# Patient Record
Sex: FEMALE | Race: WHITE | NOT HISPANIC OR LATINO | URBAN - NONMETROPOLITAN AREA
[De-identification: names, ages, dates, MRNs, and addresses within clinical notes are randomized per-mention and may not be internally consistent; named-entity substitution may affect disease eponyms.]

---

## 2023-12-04 LAB
NON-UH HIE ALANINE AMINOTRANSFERASE: 10 U/L (ref 7–52)
NON-UH HIE ALBUMIN LEVEL: 4.4 G/DL (ref 3.5–5.7)
NON-UH HIE ALBUMIN/GLOBULIN RATIO: 1.6
NON-UH HIE ALKALINE PHOSPHATASE: 88 U/L (ref 34–104)
NON-UH HIE ANION GAP: 7.3 (ref 6–15)
NON-UH HIE ASPARTATE AMINO TRANSFERASE: 14 U/L (ref 13–39)
NON-UH HIE BASOPHILS # (AUTO): 0.1 10*3/UL (ref 0–0.2)
NON-UH HIE BASOPHILS % (AUTO): 1.3 %
NON-UH HIE BILIRUBIN,TOTAL: 0.4 MG/DL (ref 0.3–1)
NON-UH HIE BLOOD UREA NITROGEN: 11 MG/DL (ref 7–25)
NON-UH HIE CALCIUM: 9.8 MG/DL (ref 8.6–10.3)
NON-UH HIE CARBON DIOXIDE: 28.9 MMOL/L (ref 21–31)
NON-UH HIE CHLORIDE: 108 MMOL/L (ref 98–107)
NON-UH HIE CHOL/HDL RATIO: 2.9
NON-UH HIE CHOLESTEROL: 173 MG/DL (ref 140–200)
NON-UH HIE CREATININE: 0.82 MG/DL (ref 0.6–1.2)
NON-UH HIE EOSINOPHILS # (AUTO): 0.1 10*3/UL (ref 0–0.45)
NON-UH HIE EOSINOPHILS % (AUTO): 1.4 %
NON-UH HIE ESTIMATED GFR: > 60
NON-UH HIE GLOBULIN: 2.8 G/DL
NON-UH HIE GLUCOSE: 94 MG/DL (ref 70–100)
NON-UH HIE HDL CHOLESTEROL: 59 MG/DL (ref 23–92)
NON-UH HIE HEMATOCRIT: 47.5 % (ref 34–46.4)
NON-UH HIE HEMOGLOBIN: 16 G/DL (ref 11.8–15.4)
NON-UH HIE LDL CHOLESTEROL,CALCULATED: 99 MG/DL (ref 0–100)
NON-UH HIE LYMPHOCYTES # (AUTO): 2.6 10*3/UL (ref 1–4.8)
NON-UH HIE LYMPHOCYTES % (AUTO): 25.1 %
NON-UH HIE MEAN CORPUSCULAR HEMOGLOBIN: 33.3 PG (ref 24.7–34.3)
NON-UH HIE MEAN CORPUSCULAR HGB CONC: 33.7 G/DL (ref 32–35)
NON-UH HIE MEAN CORPUSCULAR VOLUME: 99 FL (ref 80–100)
NON-UH HIE MEAN PLATELET VOLUME: 7.3 FL (ref 6.3–10.7)
NON-UH HIE MONOCYTES # (AUTO): 0.6 10*3/UL (ref 0–0.8)
NON-UH HIE MONOCYTES % (AUTO): 5.5 %
NON-UH HIE NEUTROPHILS # (AUTO): 6.9 10*3/UL (ref 1.8–7.7)
NON-UH HIE NEUTROPHILS % (AUTO): 66.7 %
NON-UH HIE NRBC%: 0.1 /100{WBC} (ref 0–0.5)
NON-UH HIE PLATELET COUNT: 250 10*3/UL (ref 150–450)
NON-UH HIE POTASSIUM: 4.2 MMOL/L (ref 3.5–5.1)
NON-UH HIE RED BLOOD COUNT: 4.8 (ref 3.6–5)
NON-UH HIE RED CELL DISTRIBUTION WIDTH: 13.6 % (ref 11.9–15.3)
NON-UH HIE SODIUM: 140 MMOL/L (ref 136–145)
NON-UH HIE TOTAL PROTEIN: 7.2 G/DL (ref 6.4–8.9)
NON-UH HIE TRIGLYCERIDE W/REFLEX: 75 MG/DL (ref 0–149)
NON-UH HIE UNCORRECTED WBC: 10.3 10*3/UL (ref 3.8–11.6)
NON-UH HIE VLDL CHOLESTEROL: 15 MG/DL
NON-UH HIE WHITE BLOOD COUNT: 10.3 10*3/UL (ref 3.8–11.6)

## 2023-12-11 ENCOUNTER — TELEPHONE (OUTPATIENT)
Dept: CARDIOLOGY | Facility: CLINIC | Age: 63
End: 2023-12-11
Payer: COMMERCIAL

## 2023-12-11 DIAGNOSIS — I25.118 CORONARY ARTERY DISEASE OF NATIVE ARTERY OF NATIVE HEART WITH STABLE ANGINA PECTORIS (CMS-HCC): ICD-10-CM

## 2023-12-11 DIAGNOSIS — E78.5 HYPERLIPIDEMIA, UNSPECIFIED HYPERLIPIDEMIA TYPE: ICD-10-CM

## 2023-12-11 NOTE — TELEPHONE ENCOUNTER
Patient called for lab results. Lab results reviewed. To Dr. Dedra Saenz MD to verify no new orders.

## 2023-12-12 PROBLEM — K21.9 GASTROESOPHAGEAL REFLUX DISEASE: Status: ACTIVE | Noted: 2023-12-12

## 2023-12-12 PROBLEM — R94.02 ABNORMAL PET SCAN OF HEAD: Status: ACTIVE | Noted: 2023-12-12

## 2023-12-12 PROBLEM — I25.118 CORONARY ARTERY DISEASE OF NATIVE ARTERY OF NATIVE HEART WITH STABLE ANGINA PECTORIS (CMS-HCC): Status: ACTIVE | Noted: 2023-12-12

## 2023-12-12 PROBLEM — E78.5 HYPERLIPIDEMIA: Status: ACTIVE | Noted: 2023-12-12

## 2023-12-12 PROBLEM — J38.1 VOCAL CORD POLYPS: Status: ACTIVE | Noted: 2023-12-12

## 2023-12-12 PROBLEM — R49.0 HOARSENESS: Status: ACTIVE | Noted: 2023-12-12

## 2023-12-12 PROBLEM — F17.210 CIGARETTE SMOKER: Status: ACTIVE | Noted: 2023-12-12

## 2023-12-12 PROBLEM — N20.0 KIDNEY STONE: Status: ACTIVE | Noted: 2023-12-12

## 2023-12-12 PROBLEM — I10 ESSENTIAL HYPERTENSION: Status: ACTIVE | Noted: 2023-12-12

## 2023-12-12 PROBLEM — Z95.5 S/P RIGHT CORONARY ARTERY (RCA) STENT PLACEMENT: Status: ACTIVE | Noted: 2023-12-12

## 2023-12-12 PROBLEM — K11.5 SIALOLITHIASIS: Status: ACTIVE | Noted: 2023-12-12

## 2023-12-12 RX ORDER — TIOTROPIUM BROMIDE INHALATION SPRAY 3.12 UG/1
SPRAY, METERED RESPIRATORY (INHALATION) EVERY 24 HOURS
COMMUNITY

## 2023-12-12 RX ORDER — POTASSIUM CHLORIDE 20 MEQ/1
20 TABLET, EXTENDED RELEASE ORAL DAILY
COMMUNITY
Start: 2023-07-27 | End: 2024-04-29 | Stop reason: SDUPTHER

## 2023-12-12 RX ORDER — ESOMEPRAZOLE MAGNESIUM 40 MG/1
1 CAPSULE, DELAYED RELEASE ORAL EVERY 24 HOURS
COMMUNITY
Start: 2018-02-09

## 2023-12-12 RX ORDER — CLOPIDOGREL BISULFATE 75 MG/1
75 TABLET ORAL DAILY
COMMUNITY
End: 2024-02-09 | Stop reason: SDUPTHER

## 2023-12-12 RX ORDER — ATORVASTATIN CALCIUM 20 MG/1
20 TABLET, FILM COATED ORAL NIGHTLY
COMMUNITY
Start: 2023-11-05 | End: 2023-12-13 | Stop reason: DRUGHIGH

## 2023-12-12 RX ORDER — METOPROLOL SUCCINATE 100 MG/1
50 TABLET, EXTENDED RELEASE ORAL DAILY
COMMUNITY
Start: 2023-11-05 | End: 2024-01-03 | Stop reason: SDUPTHER

## 2023-12-12 NOTE — TELEPHONE ENCOUNTER
Patient contacted. She is currently taking Lipitor 20 mg daily. She has taken 40 mg in the past and tolerated it. To Dr. Dedra Saenz MD

## 2023-12-13 RX ORDER — ATORVASTATIN CALCIUM 40 MG/1
40 TABLET, FILM COATED ORAL DAILY
Qty: 90 TABLET | Refills: 3 | Status: SHIPPED | OUTPATIENT
Start: 2023-12-13 | End: 2024-04-29 | Stop reason: SDUPTHER

## 2023-12-13 NOTE — TELEPHONE ENCOUNTER
Patient contacted and updated. Will mail lab order. Rx to Dr. Dedra Saenz MD to sign and sent to [facility]

## 2024-01-03 DIAGNOSIS — I10 ESSENTIAL HYPERTENSION: ICD-10-CM

## 2024-01-03 RX ORDER — METOPROLOL SUCCINATE 100 MG/1
50 TABLET, EXTENDED RELEASE ORAL DAILY
Qty: 90 TABLET | Refills: 0 | Status: SHIPPED | OUTPATIENT
Start: 2024-01-03 | End: 2024-04-29 | Stop reason: SDUPTHER

## 2024-01-18 PROBLEM — Z98.61 HISTORY OF PTCA: Status: ACTIVE | Noted: 2024-01-18

## 2024-01-18 PROBLEM — I25.10 ATHEROSCLEROSIS OF NATIVE CORONARY ARTERY OF NATIVE HEART WITHOUT ANGINA PECTORIS: Status: ACTIVE | Noted: 2024-01-18

## 2024-02-09 DIAGNOSIS — I25.10 ATHEROSCLEROSIS OF NATIVE CORONARY ARTERY OF NATIVE HEART WITHOUT ANGINA PECTORIS: ICD-10-CM

## 2024-02-09 RX ORDER — CLOPIDOGREL BISULFATE 75 MG/1
75 TABLET ORAL DAILY
Qty: 90 TABLET | Refills: 0 | Status: SHIPPED | OUTPATIENT
Start: 2024-02-09 | End: 2024-04-29 | Stop reason: SDUPTHER

## 2024-03-11 ENCOUNTER — APPOINTMENT (OUTPATIENT)
Dept: CARDIOLOGY | Facility: CLINIC | Age: 64
End: 2024-03-11
Payer: COMMERCIAL

## 2024-04-04 LAB
NON-UH HIE ALANINE AMINOTRANSFERASE: 12 U/L (ref 7–52)
NON-UH HIE ASPARTATE AMINO TRANSFERASE: 15 U/L (ref 13–39)
NON-UH HIE CHOL/HDL RATIO: 2.7
NON-UH HIE CHOLESTEROL: 129 MG/DL (ref 140–200)
NON-UH HIE HDL CHOLESTEROL: 48 MG/DL (ref 23–92)
NON-UH HIE LDL CHOLESTEROL,CALCULATED: 62 MG/DL (ref 0–100)
NON-UH HIE TRIGLYCERIDE W/REFLEX: 95 MG/DL (ref 0–149)
NON-UH HIE VLDL CHOLESTEROL: 19 MG/DL

## 2024-04-08 ENCOUNTER — APPOINTMENT (OUTPATIENT)
Dept: CARDIOLOGY | Facility: CLINIC | Age: 64
End: 2024-04-08
Payer: COMMERCIAL

## 2024-04-29 ENCOUNTER — OFFICE VISIT (OUTPATIENT)
Dept: CARDIOLOGY | Facility: CLINIC | Age: 64
End: 2024-04-29
Payer: COMMERCIAL

## 2024-04-29 VITALS
DIASTOLIC BLOOD PRESSURE: 76 MMHG | BODY MASS INDEX: 22.63 KG/M2 | HEIGHT: 62 IN | WEIGHT: 123 LBS | SYSTOLIC BLOOD PRESSURE: 138 MMHG | HEART RATE: 92 BPM

## 2024-04-29 DIAGNOSIS — I10 ESSENTIAL HYPERTENSION: ICD-10-CM

## 2024-04-29 DIAGNOSIS — E78.5 HYPERLIPIDEMIA, UNSPECIFIED HYPERLIPIDEMIA TYPE: ICD-10-CM

## 2024-04-29 DIAGNOSIS — Z13.6 SCREENING FOR AAA (ABDOMINAL AORTIC ANEURYSM): ICD-10-CM

## 2024-04-29 DIAGNOSIS — F17.210 CIGARETTE SMOKER: ICD-10-CM

## 2024-04-29 DIAGNOSIS — Z98.61 HISTORY OF PTCA: ICD-10-CM

## 2024-04-29 DIAGNOSIS — I25.10 ATHEROSCLEROSIS OF NATIVE CORONARY ARTERY OF NATIVE HEART WITHOUT ANGINA PECTORIS: ICD-10-CM

## 2024-04-29 DIAGNOSIS — Z95.5 S/P RIGHT CORONARY ARTERY (RCA) STENT PLACEMENT: ICD-10-CM

## 2024-04-29 DIAGNOSIS — I47.10 PAROXYSMAL SUPRAVENTRICULAR TACHYCARDIA (CMS-HCC): ICD-10-CM

## 2024-04-29 DIAGNOSIS — I25.118 CORONARY ARTERY DISEASE OF NATIVE ARTERY OF NATIVE HEART WITH STABLE ANGINA PECTORIS (CMS-HCC): ICD-10-CM

## 2024-04-29 PROCEDURE — 1160F RVW MEDS BY RX/DR IN RCRD: CPT | Performed by: INTERNAL MEDICINE

## 2024-04-29 PROCEDURE — 3008F BODY MASS INDEX DOCD: CPT | Performed by: INTERNAL MEDICINE

## 2024-04-29 PROCEDURE — 3078F DIAST BP <80 MM HG: CPT | Performed by: INTERNAL MEDICINE

## 2024-04-29 PROCEDURE — 3075F SYST BP GE 130 - 139MM HG: CPT | Performed by: INTERNAL MEDICINE

## 2024-04-29 PROCEDURE — 1159F MED LIST DOCD IN RCRD: CPT | Performed by: INTERNAL MEDICINE

## 2024-04-29 PROCEDURE — 99214 OFFICE O/P EST MOD 30 MIN: CPT | Performed by: INTERNAL MEDICINE

## 2024-04-29 RX ORDER — ATORVASTATIN CALCIUM 40 MG/1
40 TABLET, FILM COATED ORAL DAILY
Qty: 90 TABLET | Refills: 3 | Status: SHIPPED | OUTPATIENT
Start: 2024-04-29 | End: 2025-04-29

## 2024-04-29 RX ORDER — CLOPIDOGREL BISULFATE 75 MG/1
75 TABLET ORAL DAILY
Qty: 90 TABLET | Refills: 3 | Status: SHIPPED | OUTPATIENT
Start: 2024-04-29 | End: 2025-04-29

## 2024-04-29 RX ORDER — POTASSIUM CHLORIDE 20 MEQ/1
20 TABLET, EXTENDED RELEASE ORAL DAILY
Qty: 90 TABLET | Refills: 3 | Status: SHIPPED | OUTPATIENT
Start: 2024-04-29 | End: 2025-04-29

## 2024-04-29 RX ORDER — METOPROLOL SUCCINATE 100 MG/1
50 TABLET, EXTENDED RELEASE ORAL DAILY
Qty: 45 TABLET | Refills: 3 | Status: SHIPPED | OUTPATIENT
Start: 2024-04-29 | End: 2025-04-29

## 2024-04-29 NOTE — PROGRESS NOTES
"Most recently seen July 2023.  We do not have any records pertaining to previous cardiac workup to include cardiac catheterization PCI echo stress test.    Subjective :     Interval review of systems is negative for chest discomfort pressure tightness heaviness palpitations lightheadedness orthopnea paroxysmal nocturnal dyspnea dependent edema or claudication TIA or CVA type symptoms or bleeding diathesis      History so Far :  1. Atherosclerotic native vessel coronary artery disease with history of coronary event in 2017 and 1 stent-this is per patient  2. No symptoms of angina or palpitations or congestive heart failure at this time  3. Current 1 pack/day smoker  4. GERD-on prophylaxis  5. Patient remains on dual antiplatelet therapy  6. History of partial hysterectomy  7. History of renal lithotripsy  8. History of paroxysmal supraventricular tachycardia  9. Hyperlipidemia  10.  Has established primary care with Dr. Hill Sutherland.  11.  Carotid ultrasound August 2021-less than 50% bilateral internal carotid artery stenosis    Objective   Wt Readings from Last 3 Encounters:   04/29/24 55.8 kg (123 lb)   07/27/23 52.6 kg (116 lb)   08/04/22 47.2 kg (104 lb)            Vitals:    04/29/24 1255   BP: 138/76   BP Location: Right arm   Patient Position: Sitting   Pulse: 92   Weight: 55.8 kg (123 lb)   Height: 1.575 m (5' 2\")                Physical Exam:    GENERAL APPEARANCE: in no acute distress.  CHEST: Symmetric and non-tender.  INTEGUMENT: Skin warm and dry  HEENT: No gross abnormalities identified.No pallor or scleral icterus.  NECK: Supple, no JVD, no bruit.   NEURO/PSHCY: Alert and oriented x3; appropriate behavior and responses and responses  LUNGS: Clear to auscultation bilaterally; normal respiratory effort.  HEART: Rate and rhythm regular with no evident murmur; no gallop appreciated.   ABDOMEN: Soft, non tender.  MUSCULOSKELETAL: No gross deformities.  EXTREMITIES: Warm  There is no edema " noted.    Meds:  Current Outpatient Medications   Medication Instructions    atorvastatin (LIPITOR) 40 mg, oral, Daily    clopidogrel (PLAVIX) 75 mg, oral, Daily    esomeprazole (NexIUM) 40 mg DR capsule 1 capsule, Every 24 hours    metoprolol succinate XL (TOPROL-XL) 50 mg, oral, Daily    potassium chloride CR 20 mEq ER tablet 20 mEq, oral, Daily    tiotropium (Spiriva Respimat) 2.5 mcg/actuation inhaler Every 24 hours          No Known Allergies    Lipid profile April 2024 total cholesterol 129 HDL 48 triglycerides 95 LDL 62 ALT 12 AST 15      LABS:    Lab Results   Component Value Date    WBC 8.5 05/12/2022    HGB 16.2 (H) 05/12/2022    HCT 48.5 (H) 05/12/2022     05/12/2022    CHOL 101 (L) 07/29/2021    TRIG 87 07/29/2021    HDL 32 (L) 07/29/2021    ALT 11 05/12/2022    AST 18 05/12/2022     05/12/2022    K 3.9 05/12/2022    CL 99 05/12/2022    CREATININE 0.6 05/12/2022    BUN 9 05/12/2022    CO2 26 05/12/2022    TSH 1.66 05/12/2022    INR 0.9 03/21/2019    HGBA1C 5.4 05/12/2022                       Patient Active Problem List    Diagnosis Date Noted    BMI 22.0-22.9, adult 04/29/2024    Paroxysmal supraventricular tachycardia (CMS-HCC) 04/29/2024    Atherosclerosis of native coronary artery of native heart without angina pectoris 01/18/2024    History of PTCA 01/18/2024    Abnormal PET scan of head 12/12/2023    Coronary artery disease of native artery of native heart with stable angina pectoris (CMS-HCC) 12/12/2023    Gastroesophageal reflux disease 12/12/2023    Sialolithiasis 12/12/2023    Essential hypertension 12/12/2023    Hoarseness 12/12/2023    Kidney stone 12/12/2023    S/P right coronary artery (RCA) stent placement 12/12/2023    Cigarette smoker 12/12/2023    Vocal cord polyps 12/12/2023    Hyperlipidemia 12/12/2023                 Assessment:    1. Coronary artery disease of native artery of native heart with stable angina pectoris (CMS-HCC)  atorvastatin (Lipitor) 40 mg tablet     potassium chloride CR 20 mEq ER tablet    Comprehensive Metabolic Panel      2. Hyperlipidemia, unspecified hyperlipidemia type  atorvastatin (Lipitor) 40 mg tablet    Lipid Panel      3. Atherosclerosis of native coronary artery of native heart without angina pectoris  clopidogrel (Plavix) 75 mg tablet    Comprehensive Metabolic Panel      4. Essential hypertension  Follow Up In Cardiology    metoprolol succinate XL (Toprol-XL) 100 mg 24 hr tablet    potassium chloride CR 20 mEq ER tablet    Comprehensive Metabolic Panel      5. Cigarette smoker  Vascular US Abdominal Aorta Aneurysm AAA Screening      6. BMI 22.0-22.9, adult        7. S/P right coronary artery (RCA) stent placement        8. History of PTCA        9. Paroxysmal supraventricular tachycardia (CMS-HCC)  potassium chloride CR 20 mEq ER tablet    Comprehensive Metabolic Panel      10. Screening for AAA (abdominal aortic aneurysm)  Vascular US Abdominal Aorta Aneurysm AAA Screening         Clinical decision making:  Still waiting for records pertaining to prior cardiac workup such as cardiac catheterization echo PCI reports.  Heart healthy lifestyle discussed, smoking cessation encouraged.  Comprehensive profile and lipid profile to be done prior to next visit  Screening abdominal aortic ultrasound-age 63, smoker, call for results.  Blood pressure is at target  Lipid profile is at target.  BMI is at target    Follow up : after testing        Provider Attestation - Scribe documentation    All medical record entries made by the Scribe were at my direction and personally dictated by me. I have reviewed the chart and agree that the record accurately reflects my personal performance of the history, physical exam, discussion and plan.

## 2024-04-29 NOTE — LETTER
"April 30, 2024     Hill Sutherland MD  [address] [place] [address]  [place] [address]    Patient: Lamar Neal   YOB: 1960   Date of Visit: 4/29/2024       Dear Dr. Hill Sutherland MD:    Thank you for referring Lamar Neal to me for evaluation. Below are my notes for this consultation.  If you have questions, please do not hesitate to call me. I look forward to following your patient along with you.       Sincerely,     Dedra Saenz MD      CC: No Recipients  ______________________________________________________________________________________    Most recently seen July 2023.  We do not have any records pertaining to previous cardiac workup to include cardiac catheterization PCI echo stress test.    Subjective :     Interval review of systems is negative for chest discomfort pressure tightness heaviness palpitations lightheadedness orthopnea paroxysmal nocturnal dyspnea dependent edema or claudication TIA or CVA type symptoms or bleeding diathesis      History so Far :  1. Atherosclerotic native vessel coronary artery disease with history of coronary event in 2017 and 1 stent-this is per patient  2. No symptoms of angina or palpitations or congestive heart failure at this time  3. Current 1 pack/day smoker  4. GERD-on prophylaxis  5. Patient remains on dual antiplatelet therapy  6. History of partial hysterectomy  7. History of renal lithotripsy  8. History of paroxysmal supraventricular tachycardia  9. Hyperlipidemia  10.  Has established primary care with Dr. Hill Sutherland.  11.  Carotid ultrasound August 2021-less than 50% bilateral internal carotid artery stenosis    Objective   Wt Readings from Last 3 Encounters:   04/29/24 55.8 kg (123 lb)   07/27/23 52.6 kg (116 lb)   08/04/22 47.2 kg (104 lb)            Vitals:    04/29/24 1255   BP: 138/76   BP Location: Right arm   Patient Position: Sitting   Pulse: 92   Weight: 55.8 kg (123 lb)   Height: 1.575 m (5' 2\")                Physical Exam:    GENERAL " APPEARANCE: in no acute distress.  CHEST: Symmetric and non-tender.  INTEGUMENT: Skin warm and dry  HEENT: No gross abnormalities identified.No pallor or scleral icterus.  NECK: Supple, no JVD, no bruit.   NEURO/PSHCY: Alert and oriented x3; appropriate behavior and responses and responses  LUNGS: Clear to auscultation bilaterally; normal respiratory effort.  HEART: Rate and rhythm regular with no evident murmur; no gallop appreciated.   ABDOMEN: Soft, non tender.  MUSCULOSKELETAL: No gross deformities.  EXTREMITIES: Warm  There is no edema noted.    Meds:  Current Outpatient Medications   Medication Instructions    atorvastatin (LIPITOR) 40 mg, oral, Daily    clopidogrel (PLAVIX) 75 mg, oral, Daily    esomeprazole (NexIUM) 40 mg DR capsule 1 capsule, Every 24 hours    metoprolol succinate XL (TOPROL-XL) 50 mg, oral, Daily    potassium chloride CR 20 mEq ER tablet 20 mEq, oral, Daily    tiotropium (Spiriva Respimat) 2.5 mcg/actuation inhaler Every 24 hours          No Known Allergies    Lipid profile April 2024 total cholesterol 129 HDL 48 triglycerides 95 LDL 62 ALT 12 AST 15      LABS:    Lab Results   Component Value Date    WBC 8.5 05/12/2022    HGB 16.2 (H) 05/12/2022    HCT 48.5 (H) 05/12/2022     05/12/2022    CHOL 101 (L) 07/29/2021    TRIG 87 07/29/2021    HDL 32 (L) 07/29/2021    ALT 11 05/12/2022    AST 18 05/12/2022     05/12/2022    K 3.9 05/12/2022    CL 99 05/12/2022    CREATININE 0.6 05/12/2022    BUN 9 05/12/2022    CO2 26 05/12/2022    TSH 1.66 05/12/2022    INR 0.9 03/21/2019    HGBA1C 5.4 05/12/2022                       Patient Active Problem List    Diagnosis Date Noted    BMI 22.0-22.9, adult 04/29/2024    Paroxysmal supraventricular tachycardia (CMS-HCC) 04/29/2024    Atherosclerosis of native coronary artery of native heart without angina pectoris 01/18/2024    History of PTCA 01/18/2024    Abnormal PET scan of head 12/12/2023    Coronary artery disease of native artery of  native heart with stable angina pectoris (CMS-HCC) 12/12/2023    Gastroesophageal reflux disease 12/12/2023    Sialolithiasis 12/12/2023    Essential hypertension 12/12/2023    Hoarseness 12/12/2023    Kidney stone 12/12/2023    S/P right coronary artery (RCA) stent placement 12/12/2023    Cigarette smoker 12/12/2023    Vocal cord polyps 12/12/2023    Hyperlipidemia 12/12/2023                 Assessment:    1. Coronary artery disease of native artery of native heart with stable angina pectoris (CMS-HCC)  atorvastatin (Lipitor) 40 mg tablet    potassium chloride CR 20 mEq ER tablet    Comprehensive Metabolic Panel      2. Hyperlipidemia, unspecified hyperlipidemia type  atorvastatin (Lipitor) 40 mg tablet    Lipid Panel      3. Atherosclerosis of native coronary artery of native heart without angina pectoris  clopidogrel (Plavix) 75 mg tablet    Comprehensive Metabolic Panel      4. Essential hypertension  Follow Up In Cardiology    metoprolol succinate XL (Toprol-XL) 100 mg 24 hr tablet    potassium chloride CR 20 mEq ER tablet    Comprehensive Metabolic Panel      5. Cigarette smoker  Vascular US Abdominal Aorta Aneurysm AAA Screening      6. BMI 22.0-22.9, adult        7. S/P right coronary artery (RCA) stent placement        8. History of PTCA        9. Paroxysmal supraventricular tachycardia (CMS-[facility])  potassium chloride CR 20 mEq ER tablet    Comprehensive Metabolic Panel      10. Screening for AAA (abdominal aortic aneurysm)  Vascular US Abdominal Aorta Aneurysm AAA Screening         Clinical decision making:  Still waiting for records pertaining to prior cardiac workup such as cardiac catheterization echo PCI reports.  Heart healthy lifestyle discussed, smoking cessation encouraged.  Comprehensive profile and lipid profile to be done prior to next visit  Screening abdominal aortic ultrasound-age 63, smoker, call for results.  Blood pressure is at target  Lipid profile is at target.  BMI is at target    Follow  up : {follow up:69524}        Provider Attestation - Scribe documentation    All medical record entries made by the Scribe were at my direction and personally dictated by me. I have reviewed the chart and agree that the record accurately reflects my personal performance of the history, physical exam, discussion and plan.

## 2024-05-14 ENCOUNTER — TELEPHONE (OUTPATIENT)
Dept: CARDIOLOGY | Facility: CLINIC | Age: 64
End: 2024-05-14
Payer: COMMERCIAL

## 2024-05-14 NOTE — TELEPHONE ENCOUNTER
----- Message from Danya Jett sent at 5/14/2024 11:01 AM EDT -----  Patient called office today and advised she went to [facility] urgent care. She had a chest x-ray done and was originally told she had pneumonia, then radiology advised her it was not pneumonia, but something was seen and she should get a referral to a pulmonologist. Patient would like to know if it is possible for Dr. Saenz to refer her to Dr. Parikh / Dr. Nolen. Please advise.

## 2024-05-30 ENCOUNTER — HOSPITAL ENCOUNTER (OUTPATIENT)
Dept: CARDIOLOGY | Facility: CLINIC | Age: 64
Discharge: HOME | End: 2024-05-30
Payer: COMMERCIAL

## 2024-05-30 DIAGNOSIS — F17.210 CIGARETTE SMOKER: ICD-10-CM

## 2024-05-30 DIAGNOSIS — Z13.6 SCREENING FOR AAA (ABDOMINAL AORTIC ANEURYSM): ICD-10-CM

## 2024-05-30 PROCEDURE — 76706 US ABDL AORTA SCREEN AAA: CPT

## 2024-05-30 PROCEDURE — 76706 US ABDL AORTA SCREEN AAA: CPT | Performed by: INTERNAL MEDICINE

## 2024-07-01 ENCOUNTER — TELEPHONE (OUTPATIENT)
Dept: CARDIOLOGY | Facility: CLINIC | Age: 64
End: 2024-07-01
Payer: COMMERCIAL

## 2024-07-01 NOTE — TELEPHONE ENCOUNTER
----- Message from Destiny Ross sent at 5/28/2024 11:49 AM EDT -----  Regarding: cath / echo  No cath at [facility] echo 11/2/21 now in chart   Thanks  ----- Message -----  From: Tracie Rincon LPN  Sent: 4/29/2024   1:48 PM EDT  To: Destiny Ross    2017, echo  through Dr. RODRIGUE Mccabe office [facility] for cardiac cath same year

## 2024-07-01 NOTE — TELEPHONE ENCOUNTER
Echo is now in chart, is further testing required. Please advise.    To Dr. Dedra Saenz MD for review.

## 2024-07-19 NOTE — TELEPHONE ENCOUNTER
Left message with patient requesting call back. Need to inform patient her echo from 2021 is normal and need to check and see if she is having any cardiac symptoms.

## 2024-07-22 NOTE — TELEPHONE ENCOUNTER
Phoned patient with normal ECHO results from 2021. Instructed her to report any new or worsening symptoms. Detailed VM left.

## 2025-01-10 ENCOUNTER — TELEPHONE (OUTPATIENT)
Dept: CARDIOLOGY | Facility: CLINIC | Age: 65
End: 2025-01-10
Payer: COMMERCIAL

## 2025-01-10 NOTE — TELEPHONE ENCOUNTER
Patient phones stating she has been having chest pains that come and go in the middle of her chest. States no other symptoms. Occasional dizziness and lightheadedness when these episodes occur. Also reports nausea when chest pain happens. She is seeking further advice. Please advise     Instructed to go to ER should symptoms recur.

## 2025-01-17 ENCOUNTER — OFFICE VISIT (OUTPATIENT)
Dept: CARDIOLOGY | Facility: CLINIC | Age: 65
End: 2025-01-17
Payer: COMMERCIAL

## 2025-01-17 VITALS
HEIGHT: 62 IN | SYSTOLIC BLOOD PRESSURE: 108 MMHG | DIASTOLIC BLOOD PRESSURE: 66 MMHG | HEART RATE: 68 BPM | WEIGHT: 128 LBS | BODY MASS INDEX: 23.55 KG/M2

## 2025-01-17 DIAGNOSIS — I25.118 CORONARY ARTERY DISEASE OF NATIVE ARTERY OF NATIVE HEART WITH STABLE ANGINA PECTORIS: ICD-10-CM

## 2025-01-17 DIAGNOSIS — F17.210 CIGARETTE SMOKER: ICD-10-CM

## 2025-01-17 DIAGNOSIS — K21.00 GASTROESOPHAGEAL REFLUX DISEASE WITH ESOPHAGITIS, UNSPECIFIED WHETHER HEMORRHAGE: ICD-10-CM

## 2025-01-17 DIAGNOSIS — R07.89 OTHER CHEST PAIN: ICD-10-CM

## 2025-01-17 DIAGNOSIS — Z95.5 S/P RIGHT CORONARY ARTERY (RCA) STENT PLACEMENT: ICD-10-CM

## 2025-01-17 DIAGNOSIS — I20.0 CRESCENDO ANGINA (MULTI): Primary | ICD-10-CM

## 2025-01-17 DIAGNOSIS — E78.2 MIXED HYPERLIPIDEMIA: ICD-10-CM

## 2025-01-17 DIAGNOSIS — Z79.899 MEDICATION COURSE CHANGED: ICD-10-CM

## 2025-01-17 DIAGNOSIS — Z98.61 HISTORY OF PTCA: ICD-10-CM

## 2025-01-17 PROCEDURE — 93000 ELECTROCARDIOGRAM COMPLETE: CPT | Performed by: INTERNAL MEDICINE

## 2025-01-17 PROCEDURE — 99214 OFFICE O/P EST MOD 30 MIN: CPT | Performed by: INTERNAL MEDICINE

## 2025-01-17 PROCEDURE — 1159F MED LIST DOCD IN RCRD: CPT | Performed by: INTERNAL MEDICINE

## 2025-01-17 PROCEDURE — 3008F BODY MASS INDEX DOCD: CPT | Performed by: INTERNAL MEDICINE

## 2025-01-17 PROCEDURE — 1160F RVW MEDS BY RX/DR IN RCRD: CPT | Performed by: INTERNAL MEDICINE

## 2025-01-17 PROCEDURE — 3074F SYST BP LT 130 MM HG: CPT | Performed by: INTERNAL MEDICINE

## 2025-01-17 PROCEDURE — 4004F PT TOBACCO SCREEN RCVD TLK: CPT | Performed by: INTERNAL MEDICINE

## 2025-01-17 PROCEDURE — 3078F DIAST BP <80 MM HG: CPT | Performed by: INTERNAL MEDICINE

## 2025-01-17 RX ORDER — ISOSORBIDE MONONITRATE 30 MG/1
30 TABLET, EXTENDED RELEASE ORAL DAILY
Qty: 90 TABLET | Refills: 1 | Status: SHIPPED | OUTPATIENT
Start: 2025-01-17 | End: 2026-01-17

## 2025-01-17 RX ORDER — NITROGLYCERIN 0.4 MG/1
0.4 TABLET SUBLINGUAL EVERY 5 MIN PRN
Qty: 25 TABLET | Refills: 1 | Status: SHIPPED | OUTPATIENT
Start: 2025-01-17

## 2025-01-17 RX ORDER — RANOLAZINE 500 MG/1
500 TABLET, EXTENDED RELEASE ORAL 2 TIMES DAILY
Qty: 180 TABLET | Refills: 1 | Status: SHIPPED | OUTPATIENT
Start: 2025-01-17 | End: 2026-01-17

## 2025-01-17 NOTE — LETTER
January 17, 2025     Nae Naqvi MD  [address] [facility] [address]    Patient: Lamar Neal   YOB: 1960   Date of Visit: 1/17/2025       Dear Dr. Nae Naqvi MD:    Thank you for referring Lamar Neal to me for evaluation. Below are my notes for this consultation.  If you have questions, please do not hesitate to call me. I look forward to following your patient along with you.       Sincerely,     Dedra Saenz MD      CC: No Recipients  ______________________________________________________________________________________    Patient was most recently seen by me in April 2024.    Subjective :   Patient reports worsening chest pressure and tightness over the last 6 weeks.  Usually comes on with activity, she describes it as a squeezing/heavy sensation that radiates to the jaw.  She does not have sublingual nitroglycerin.  On a couple of occasions it lasted long enough that she thought about going to the emergency room and then it subsided.  The symptoms remind her of her prior angina pectoris.  She does not complain of orthopnea PND, chest discomfort has woken her up from sleep, the other day she was not able to carry groceries because of chest discomfort.  She denies palpitations lightheadedness presyncope syncope nausea vomiting or diarrhea  Continues to smoke half pack per day, has been trying but unable.  Twelve-lead EKG today is within normal limits  We do not have any records from her prior cardiac workup such as cardiac catheterization and PCI.  She says she has papers at home and she will bring it in at the next visit.      12 point ROS is negative or non contributory except as noted.   History so Far :  1. Atherosclerotic native vessel coronary artery disease with history of coronary event in 2017 and 1 stent-this is per patient  2. No symptoms of angina or palpitations or congestive heart failure at this time  3. Current 1 pack/day smoker  4. GERD-on prophylaxis  5.  "Patient remains on dual antiplatelet therapy  6. History of partial hysterectomy  7. History of renal lithotripsy  8. History of paroxysmal supraventricular tachycardia  9. Hyperlipidemia  10.  Has established primary care with Dr. Hill Sutherland.  11.  Carotid ultrasound August 2021-less than 50% bilateral internal carotid artery stenosis       12 ultrasound of the abdominal aorta and iliac arteries June 2024-no evidence of abdominal aortic aneurysm.    Objective   Wt Readings from Last 3 Encounters:   01/17/25 58.1 kg (128 lb)   04/29/24 55.8 kg (123 lb)   07/27/23 52.6 kg (116 lb)            Vitals:    01/17/25 1505   BP: 108/66   BP Location: Left arm   Patient Position: Sitting   Pulse: 68   Weight: 58.1 kg (128 lb)   Height: 1.575 m (5' 2\")                Physical Exam:    GENERAL APPEARANCE: in no acute distress.  CHEST: Symmetric and non-tender.  INTEGUMENT: Skin warm and dry  HEENT: No gross abnormalities identified.No pallor or scleral icterus.  NECK: Supple, no JVD, no bruit.   NEURO/PSHCY: Alert and oriented x3; appropriate behavior and responses and responses  LUNGS: Clear to auscultation bilaterally; normal respiratory effort.  HEART: Rate and rhythm regular with no evident murmur; no gallop appreciated.   ABDOMEN: Soft, non tender.  MUSCULOSKELETAL: No gross deformities.  EXTREMITIES: Warm  There is no edema noted.    Meds:  Current Outpatient Medications   Medication Instructions   • atorvastatin (LIPITOR) 40 mg, oral, Daily   • clopidogrel (PLAVIX) 75 mg, oral, Daily   • esomeprazole (NexIUM) 40 mg DR capsule 1 capsule, Every 24 hours   • isosorbide mononitrate ER (IMDUR) 30 mg, oral, Daily, Do not crush or chew.   • metoprolol succinate XL (TOPROL-XL) 50 mg, oral, Daily   • nitroglycerin (NITROSTAT) 0.4 mg, sublingual, Every 5 min PRN, May repeat dose every 5 minutes for up to 3 doses total.   • potassium chloride CR 20 mEq ER tablet 20 mEq, oral, Daily   • ranolazine (RANEXA) 500 mg, oral, 2 " times daily, Do not crush, chew, or split.   • tiotropium (Spiriva Respimat) 2.5 mcg/actuation inhaler Every 24 hours          No Known Allergies          LABS:    Lab Results   Component Value Date    WBC 8.5 05/12/2022    HGB 16.2 (H) 05/12/2022    HCT 48.5 (H) 05/12/2022     05/12/2022    CHOL 101 (L) 07/29/2021    TRIG 87 07/29/2021    HDL 32 (L) 07/29/2021    ALT 11 05/12/2022    AST 18 05/12/2022     05/12/2022    K 3.9 05/12/2022    CL 99 05/12/2022    CREATININE 0.6 05/12/2022    BUN 9 05/12/2022    CO2 26 05/12/2022    TSH 1.66 05/12/2022    INR 0.9 03/21/2019    HGBA1C 5.4 05/12/2022               Lipid profile April 2024-total cholesterol 129 HDL 48, LDL 62, triglycerides 95        Patient Active Problem List    Diagnosis Date Noted   • Medication course changed 01/17/2025   • Body mass index (BMI) of 23.0 to 23.9 in adult 04/29/2024   • Paroxysmal supraventricular tachycardia (CMS-HCC) 04/29/2024   • Atherosclerosis of native coronary artery of native heart without angina pectoris 01/18/2024   • History of PTCA 01/18/2024   • Abnormal PET scan of head 12/12/2023   • Gastroesophageal reflux disease 12/12/2023   • Sialolithiasis 12/12/2023   • Essential hypertension 12/12/2023   • Hoarseness 12/12/2023   • Kidney stone 12/12/2023   • S/P right coronary artery (RCA) stent placement 12/12/2023   • Cigarette smoker 12/12/2023   • Vocal cord polyps 12/12/2023   • Hyperlipidemia 12/12/2023                 Assessment:    1. Crescendo angina (Multi)        2. Coronary artery disease of native artery of native heart with stable angina pectoris  Follow Up In Cardiology    nitroglycerin (Nitrostat) 0.4 mg SL tablet    ranolazine (Ranexa) 500 mg 12 hr tablet    Cardiac Catheterization - Onbase Scan    Comprehensive Metabolic Panel    CBC    Comprehensive Metabolic Panel    CBC      3. History of PTCA  Cardiac Catheterization - Onbase Scan      4. Mixed hyperlipidemia  Cardiac Catheterization - Onbase  Scan    Lipid Panel    Lipid Panel      5. Gastroesophageal reflux disease with esophagitis, unspecified whether hemorrhage        6. Cigarette smoker        7. Body mass index (BMI) of 23.0 to 23.9 in adult        8. Other chest pain  ECG 12 Lead    nitroglycerin (Nitrostat) 0.4 mg SL tablet    ranolazine (Ranexa) 500 mg 12 hr tablet    Cardiac Catheterization - Onbase Scan    isosorbide mononitrate ER (Imdur) 30 mg 24 hr tablet      9. Medication course changed        10. S/P right coronary artery (RCA) stent placement        Clinical decision making:  Patient with atherosclerotic native vessel coronary artery disease and crescendo angina pectoris.  Symptoms are occurring more frequently and lasting longer.  They remind her of her prior angina.  Half pack per day smoker  Lipid profile is at target  Blood pressure is on the low side  Patient is already on a beta-blocker.  Patient is already on aspirin and clopidogrel  We will add Ranexa 500 mg p.o. twice daily, and isosorbide 30 mg p.o. daily.  Potential side effects of isosorbide were reviewed she can take Tylenol for headaches  Prescription was given for sublingual nitroglycerin and patient was instructed on its appropriate use and precautions  She was told that if symptoms escalate she needs to seek emergent medical attention  She will need coronary angiography and possible intervention-procedure risks benefits and alternatives were discussed, risks discussed included but were not limited to MI, stroke, death, peripheral vascular compromise and allergic reaction to dye.  Procedure to be [facility] at [facility] early next week  Follow-up after testing  At every visit we talked about nicotine cessation.    Follow up : after testing        Provider Attestation - Jazzmine WASHINGTON LPN Scribe documentation    All medical record entries made by the Scribe were at my direction and personally dictated by me. I have reviewed the chart and agree that the record accurately reflects my  personal performance of the history, physical exam, discussion and plan.

## 2025-01-17 NOTE — H&P (VIEW-ONLY)
Patient was most recently seen by me in April 2024.    Subjective :   Patient reports worsening chest pressure and tightness over the last 6 weeks.  Usually comes on with activity, she describes it as a squeezing/heavy sensation that radiates to the jaw.  She does not have sublingual nitroglycerin.  On a couple of occasions it lasted long enough that she thought about going to the emergency room and then it subsided.  The symptoms remind her of her prior angina pectoris.  She does not complain of orthopnea PND, chest discomfort has woken her up from sleep, the other day she was not able to carry groceries because of chest discomfort.  She denies palpitations lightheadedness presyncope syncope nausea vomiting or diarrhea  Continues to smoke half pack per day, has been trying but unable.  Twelve-lead EKG today is within normal limits  We do not have any records from her prior cardiac workup such as cardiac catheterization and PCI.  She says she has papers at home and she will bring it in at the next visit.      12 point ROS is negative or non contributory except as noted.   History so Far :  1. Atherosclerotic native vessel coronary artery disease with history of coronary event in 2017 and 1 stent-this is per patient  2. No symptoms of angina or palpitations or congestive heart failure at this time  3. Current 1 pack/day smoker  4. GERD-on prophylaxis  5. Patient remains on dual antiplatelet therapy  6. History of partial hysterectomy  7. History of renal lithotripsy  8. History of paroxysmal supraventricular tachycardia  9. Hyperlipidemia  10.  Has established primary care with Dr. Hill Sutherland.  11.  Carotid ultrasound August 2021-less than 50% bilateral internal carotid artery stenosis       12 ultrasound of the abdominal aorta and iliac arteries June 2024-no evidence of abdominal aortic aneurysm.    Objective   Wt Readings from Last 3 Encounters:   01/17/25 58.1 kg (128 lb)   04/29/24 55.8 kg (123 lb)  "  07/27/23 52.6 kg (116 lb)            Vitals:    01/17/25 1505   BP: 108/66   BP Location: Left arm   Patient Position: Sitting   Pulse: 68   Weight: 58.1 kg (128 lb)   Height: 1.575 m (5' 2\")                Physical Exam:    GENERAL APPEARANCE: in no acute distress.  CHEST: Symmetric and non-tender.  INTEGUMENT: Skin warm and dry  HEENT: No gross abnormalities identified.No pallor or scleral icterus.  NECK: Supple, no JVD, no bruit.   NEURO/PSHCY: Alert and oriented x3; appropriate behavior and responses and responses  LUNGS: Clear to auscultation bilaterally; normal respiratory effort.  HEART: Rate and rhythm regular with no evident murmur; no gallop appreciated.   ABDOMEN: Soft, non tender.  MUSCULOSKELETAL: No gross deformities.  EXTREMITIES: Warm  There is no edema noted.    Meds:  Current Outpatient Medications   Medication Instructions    atorvastatin (LIPITOR) 40 mg, oral, Daily    clopidogrel (PLAVIX) 75 mg, oral, Daily    esomeprazole (NexIUM) 40 mg DR capsule 1 capsule, Every 24 hours    isosorbide mononitrate ER (IMDUR) 30 mg, oral, Daily, Do not crush or chew.    metoprolol succinate XL (TOPROL-XL) 50 mg, oral, Daily    nitroglycerin (NITROSTAT) 0.4 mg, sublingual, Every 5 min PRN, May repeat dose every 5 minutes for up to 3 doses total.    potassium chloride CR 20 mEq ER tablet 20 mEq, oral, Daily    ranolazine (RANEXA) 500 mg, oral, 2 times daily, Do not crush, chew, or split.    tiotropium (Spiriva Respimat) 2.5 mcg/actuation inhaler Every 24 hours          No Known Allergies          LABS:    Lab Results   Component Value Date    WBC 8.5 05/12/2022    HGB 16.2 (H) 05/12/2022    HCT 48.5 (H) 05/12/2022     05/12/2022    CHOL 101 (L) 07/29/2021    TRIG 87 07/29/2021    HDL 32 (L) 07/29/2021    ALT 11 05/12/2022    AST 18 05/12/2022     05/12/2022    K 3.9 05/12/2022    CL 99 05/12/2022    CREATININE 0.6 05/12/2022    BUN 9 05/12/2022    CO2 26 05/12/2022    TSH 1.66 05/12/2022    INR 0.9 " 03/21/2019    HGBA1C 5.4 05/12/2022               Lipid profile April 2024-total cholesterol 129 HDL 48, LDL 62, triglycerides 95        Patient Active Problem List    Diagnosis Date Noted    Medication course changed 01/17/2025    Body mass index (BMI) of 23.0 to 23.9 in adult 04/29/2024    Paroxysmal supraventricular tachycardia (CMS-HCC) 04/29/2024    Atherosclerosis of native coronary artery of native heart without angina pectoris 01/18/2024    History of PTCA 01/18/2024    Abnormal PET scan of head 12/12/2023    Gastroesophageal reflux disease 12/12/2023    Sialolithiasis 12/12/2023    Essential hypertension 12/12/2023    Hoarseness 12/12/2023    Kidney stone 12/12/2023    S/P right coronary artery (RCA) stent placement 12/12/2023    Cigarette smoker 12/12/2023    Vocal cord polyps 12/12/2023    Hyperlipidemia 12/12/2023                 Assessment:    1. Crescendo angina (Multi)        2. Coronary artery disease of native artery of native heart with stable angina pectoris  Follow Up In Cardiology    nitroglycerin (Nitrostat) 0.4 mg SL tablet    ranolazine (Ranexa) 500 mg 12 hr tablet    Cardiac Catheterization - Onbase Scan    Comprehensive Metabolic Panel    CBC    Comprehensive Metabolic Panel    CBC      3. History of PTCA  Cardiac Catheterization - Onbase Scan      4. Mixed hyperlipidemia  Cardiac Catheterization - Onbase Scan    Lipid Panel    Lipid Panel      5. Gastroesophageal reflux disease with esophagitis, unspecified whether hemorrhage        6. Cigarette smoker        7. Body mass index (BMI) of 23.0 to 23.9 in adult        8. Other chest pain  ECG 12 Lead    nitroglycerin (Nitrostat) 0.4 mg SL tablet    ranolazine (Ranexa) 500 mg 12 hr tablet    Cardiac Catheterization - Onbase Scan    isosorbide mononitrate ER (Imdur) 30 mg 24 hr tablet      9. Medication course changed        10. S/P right coronary artery (RCA) stent placement        Clinical decision making:  Patient with atherosclerotic native  vessel coronary artery disease and crescendo angina pectoris.  Symptoms are occurring more frequently and lasting longer.  They remind her of her prior angina.  Half pack per day smoker  Lipid profile is at target  Blood pressure is on the low side  Patient is already on a beta-blocker.  Patient is already on aspirin and clopidogrel  We will add Ranexa 500 mg p.o. twice daily, and isosorbide 30 mg p.o. daily.  Potential side effects of isosorbide were reviewed she can take Tylenol for headaches  Prescription was given for sublingual nitroglycerin and patient was instructed on its appropriate use and precautions  She was told that if symptoms escalate she needs to seek emergent medical attention  She will need coronary angiography and possible intervention-procedure risks benefits and alternatives were discussed, risks discussed included but were not limited to MI, stroke, death, peripheral vascular compromise and allergic reaction to dye.  Procedure to be scheduled at [facility] early next week  Follow-up after testing  At every visit we talked about nicotine cessation.    Follow up : after testing        Provider Attestation - Jazzmine WASHINGTON LPN Scribe documentation    All medical record entries made by the Scribe were at my direction and personally dictated by me. I have reviewed the chart and agree that the record accurately reflects my personal performance of the history, physical exam, discussion and plan.

## 2025-01-17 NOTE — PROGRESS NOTES
Patient was most recently seen by me in April 2024.    Subjective :   Patient reports worsening chest pressure and tightness over the last 6 weeks.  Usually comes on with activity, she describes it as a squeezing/heavy sensation that radiates to the jaw.  She does not have sublingual nitroglycerin.  On a couple of occasions it lasted long enough that she thought about going to the emergency room and then it subsided.  The symptoms remind her of her prior angina pectoris.  She does not complain of orthopnea PND, chest discomfort has woken her up from sleep, the other day she was not able to carry groceries because of chest discomfort.  She denies palpitations lightheadedness presyncope syncope nausea vomiting or diarrhea  Continues to smoke half pack per day, has been trying but unable.  Twelve-lead EKG today is within normal limits  We do not have any records from her prior cardiac workup such as cardiac catheterization and PCI.  She says she has papers at home and she will bring it in at the next visit.      12 point ROS is negative or non contributory except as noted.   History so Far :  1. Atherosclerotic native vessel coronary artery disease with history of coronary event in 2017 and 1 stent-this is per patient  2. No symptoms of angina or palpitations or congestive heart failure at this time  3. Current 1 pack/day smoker  4. GERD-on prophylaxis  5. Patient remains on dual antiplatelet therapy  6. History of partial hysterectomy  7. History of renal lithotripsy  8. History of paroxysmal supraventricular tachycardia  9. Hyperlipidemia  10.  Has established primary care with Dr. Hill Sutherland.  11.  Carotid ultrasound August 2021-less than 50% bilateral internal carotid artery stenosis       12 ultrasound of the abdominal aorta and iliac arteries June 2024-no evidence of abdominal aortic aneurysm.    Objective   Wt Readings from Last 3 Encounters:   01/17/25 58.1 kg (128 lb)   04/29/24 55.8 kg (123 lb)  "  07/27/23 52.6 kg (116 lb)            Vitals:    01/17/25 1505   BP: 108/66   BP Location: Left arm   Patient Position: Sitting   Pulse: 68   Weight: 58.1 kg (128 lb)   Height: 1.575 m (5' 2\")                Physical Exam:    GENERAL APPEARANCE: in no acute distress.  CHEST: Symmetric and non-tender.  INTEGUMENT: Skin warm and dry  HEENT: No gross abnormalities identified.No pallor or scleral icterus.  NECK: Supple, no JVD, no bruit.   NEURO/PSHCY: Alert and oriented x3; appropriate behavior and responses and responses  LUNGS: Clear to auscultation bilaterally; normal respiratory effort.  HEART: Rate and rhythm regular with no evident murmur; no gallop appreciated.   ABDOMEN: Soft, non tender.  MUSCULOSKELETAL: No gross deformities.  EXTREMITIES: Warm  There is no edema noted.    Meds:  Current Outpatient Medications   Medication Instructions    atorvastatin (LIPITOR) 40 mg, oral, Daily    clopidogrel (PLAVIX) 75 mg, oral, Daily    esomeprazole (NexIUM) 40 mg DR capsule 1 capsule, Every 24 hours    isosorbide mononitrate ER (IMDUR) 30 mg, oral, Daily, Do not crush or chew.    metoprolol succinate XL (TOPROL-XL) 50 mg, oral, Daily    nitroglycerin (NITROSTAT) 0.4 mg, sublingual, Every 5 min PRN, May repeat dose every 5 minutes for up to 3 doses total.    potassium chloride CR 20 mEq ER tablet 20 mEq, oral, Daily    ranolazine (RANEXA) 500 mg, oral, 2 times daily, Do not crush, chew, or split.    tiotropium (Spiriva Respimat) 2.5 mcg/actuation inhaler Every 24 hours          No Known Allergies          LABS:    Lab Results   Component Value Date    WBC 8.5 05/12/2022    HGB 16.2 (H) 05/12/2022    HCT 48.5 (H) 05/12/2022     05/12/2022    CHOL 101 (L) 07/29/2021    TRIG 87 07/29/2021    HDL 32 (L) 07/29/2021    ALT 11 05/12/2022    AST 18 05/12/2022     05/12/2022    K 3.9 05/12/2022    CL 99 05/12/2022    CREATININE 0.6 05/12/2022    BUN 9 05/12/2022    CO2 26 05/12/2022    TSH 1.66 05/12/2022    INR 0.9 " 03/21/2019    HGBA1C 5.4 05/12/2022               Lipid profile April 2024-total cholesterol 129 HDL 48, LDL 62, triglycerides 95        Patient Active Problem List    Diagnosis Date Noted    Medication course changed 01/17/2025    Body mass index (BMI) of 23.0 to 23.9 in adult 04/29/2024    Paroxysmal supraventricular tachycardia (CMS-HCC) 04/29/2024    Atherosclerosis of native coronary artery of native heart without angina pectoris 01/18/2024    History of PTCA 01/18/2024    Abnormal PET scan of head 12/12/2023    Gastroesophageal reflux disease 12/12/2023    Sialolithiasis 12/12/2023    Essential hypertension 12/12/2023    Hoarseness 12/12/2023    Kidney stone 12/12/2023    S/P right coronary artery (RCA) stent placement 12/12/2023    Cigarette smoker 12/12/2023    Vocal cord polyps 12/12/2023    Hyperlipidemia 12/12/2023                 Assessment:    1. Crescendo angina (Multi)        2. Coronary artery disease of native artery of native heart with stable angina pectoris  Follow Up In Cardiology    nitroglycerin (Nitrostat) 0.4 mg SL tablet    ranolazine (Ranexa) 500 mg 12 hr tablet    Cardiac Catheterization - Onbase Scan    Comprehensive Metabolic Panel    CBC    Comprehensive Metabolic Panel    CBC      3. History of PTCA  Cardiac Catheterization - Onbase Scan      4. Mixed hyperlipidemia  Cardiac Catheterization - Onbase Scan    Lipid Panel    Lipid Panel      5. Gastroesophageal reflux disease with esophagitis, unspecified whether hemorrhage        6. Cigarette smoker        7. Body mass index (BMI) of 23.0 to 23.9 in adult        8. Other chest pain  ECG 12 Lead    nitroglycerin (Nitrostat) 0.4 mg SL tablet    ranolazine (Ranexa) 500 mg 12 hr tablet    Cardiac Catheterization - Onbase Scan    isosorbide mononitrate ER (Imdur) 30 mg 24 hr tablet      9. Medication course changed        10. S/P right coronary artery (RCA) stent placement        Clinical decision making:  Patient with atherosclerotic native  vessel coronary artery disease and crescendo angina pectoris.  Symptoms are occurring more frequently and lasting longer.  They remind her of her prior angina.  Half pack per day smoker  Lipid profile is at target  Blood pressure is on the low side  Patient is already on a beta-blocker.  Patient is already on aspirin and clopidogrel  We will add Ranexa 500 mg p.o. twice daily, and isosorbide 30 mg p.o. daily.  Potential side effects of isosorbide were reviewed she can take Tylenol for headaches  Prescription was given for sublingual nitroglycerin and patient was instructed on its appropriate use and precautions  She was told that if symptoms escalate she needs to seek emergent medical attention  She will need coronary angiography and possible intervention-procedure risks benefits and alternatives were discussed, risks discussed included but were not limited to MI, stroke, death, peripheral vascular compromise and allergic reaction to dye.  Procedure to be scheduled at [facility] early next week  Follow-up after testing  At every visit we talked about nicotine cessation.    Follow up : after testing        Provider Attestation - Jazzmine WASHINGTON LPN Scribe documentation    All medical record entries made by the Scribe were at my direction and personally dictated by me. I have reviewed the chart and agree that the record accurately reflects my personal performance of the history, physical exam, discussion and plan.

## 2025-01-18 LAB
NON-UH HIE ALANINE AMINOTRANSFERASE: ABNORMAL U/L (ref 7–52)
NON-UH HIE ALBUMIN LEVEL: ABNORMAL G/DL (ref 3.5–5.7)
NON-UH HIE ALBUMIN/GLOBULIN RATIO: ABNORMAL
NON-UH HIE ALKALINE PHOSPHATASE: ABNORMAL U/L (ref 34–104)
NON-UH HIE ANION GAP: ABNORMAL MEQ/L (ref 6–15)
NON-UH HIE ASPARTATE AMINO TRANSFERASE: ABNORMAL U/L (ref 13–39)
NON-UH HIE BASOPHILS # (AUTO): ABNORMAL 10*3/UL (ref 0–0.2)
NON-UH HIE BASOPHILS % (AUTO): ABNORMAL %
NON-UH HIE BILIRUBIN,TOTAL: ABNORMAL MG/DL (ref 0.3–1)
NON-UH HIE BLOOD UREA NITROGEN: ABNORMAL MG/DL (ref 7–25)
NON-UH HIE CALCIUM: ABNORMAL MG/DL (ref 8.6–10.3)
NON-UH HIE CARBON DIOXIDE: ABNORMAL MMOL/L (ref 21–31)
NON-UH HIE CHLORIDE: ABNORMAL MMOL/L (ref 98–107)
NON-UH HIE CHOL/HDL RATIO: ABNORMAL
NON-UH HIE CHOLESTEROL: ABNORMAL MG/DL (ref 140–200)
NON-UH HIE CREATININE: ABNORMAL MG/DL (ref 0.6–1.2)
NON-UH HIE EOSINOPHILS # (AUTO): ABNORMAL 10*3/UL (ref 0–0.45)
NON-UH HIE EOSINOPHILS % (AUTO): ABNORMAL %
NON-UH HIE ESTIMATED GFR: >60 ML/MIN
NON-UH HIE GLOBULIN: ABNORMAL G/DL
NON-UH HIE GLUCOSE: ABNORMAL MG/DL (ref 70–100)
NON-UH HIE HDL CHOLESTEROL: ABNORMAL MG/DL (ref 23–92)
NON-UH HIE HEMATOCRIT: ABNORMAL % (ref 34–46.4)
NON-UH HIE HEMOGLOBIN: ABNORMAL G/DL (ref 11.8–15.4)
NON-UH HIE LDL CHOLESTEROL,CALCULATED: ABNORMAL MG/DL (ref 0–100)
NON-UH HIE LYMPHOCYTES # (AUTO): ABNORMAL 10*3/UL (ref 1–4.8)
NON-UH HIE LYMPHOCYTES % (AUTO): ABNORMAL %
NON-UH HIE MEAN CORPUSCULAR HEMOGLOBIN: ABNORMAL PG (ref 24.7–34.3)
NON-UH HIE MEAN CORPUSCULAR HGB CONC: ABNORMAL G/DL (ref 32–35)
NON-UH HIE MEAN CORPUSCULAR VOLUME: ABNORMAL FL (ref 80–100)
NON-UH HIE MEAN PLATELET VOLUME: ABNORMAL FL (ref 6.3–10.7)
NON-UH HIE MONOCYTES # (AUTO): ABNORMAL 10*3/UL (ref 0–0.8)
NON-UH HIE MONOCYTES % (AUTO): ABNORMAL %
NON-UH HIE NEUTROPHILS # (AUTO): ABNORMAL 10*3/UL (ref 1.8–7.7)
NON-UH HIE NEUTROPHILS % (AUTO): ABNORMAL %
NON-UH HIE NRBC%: ABNORMAL /100{WBC} (ref 0–0.5)
NON-UH HIE PLATELET COUNT: ABNORMAL 10*3/UL (ref 150–450)
NON-UH HIE POTASSIUM: ABNORMAL MMOL/L (ref 3.5–5.1)
NON-UH HIE RED BLOOD COUNT: ABNORMAL 10*6/UL (ref 3.6–5)
NON-UH HIE RED CELL DISTRIBUTION WIDTH: ABNORMAL % (ref 11.9–15.3)
NON-UH HIE SODIUM: ABNORMAL MMOL/L (ref 136–145)
NON-UH HIE TOTAL PROTEIN: ABNORMAL G/DL (ref 6.4–8.9)
NON-UH HIE TRIGLYCERIDE W/REFLEX: ABNORMAL MG/DL (ref 0–149)
NON-UH HIE UNCORRECTED WBC: ABNORMAL 10*3/UL (ref 3.8–11.6)
NON-UH HIE VLDL CHOLESTEROL: ABNORMAL MG/DL
NON-UH HIE WHITE BLOOD COUNT: ABNORMAL 10*3/UL (ref 3.8–11.6)

## 2025-01-20 ENCOUNTER — TELEPHONE (OUTPATIENT)
Dept: CARDIOLOGY | Facility: CLINIC | Age: 65
End: 2025-01-20
Payer: COMMERCIAL

## 2025-01-20 DIAGNOSIS — Z95.5 S/P RIGHT CORONARY ARTERY (RCA) STENT PLACEMENT: ICD-10-CM

## 2025-01-20 DIAGNOSIS — I25.118 ATHSCL HEART DISEASE OF NATIVE COR ART W OTH ANG PCTRS: ICD-10-CM

## 2025-01-20 DIAGNOSIS — I25.10 ATHEROSCLEROSIS OF NATIVE CORONARY ARTERY OF NATIVE HEART WITHOUT ANGINA PECTORIS: ICD-10-CM

## 2025-01-20 DIAGNOSIS — E78.2 MIXED HYPERLIPIDEMIA: ICD-10-CM

## 2025-01-20 DIAGNOSIS — I20.0 ANGINA PECTORIS, UNSTABLE (MULTI): Primary | ICD-10-CM

## 2025-01-20 DIAGNOSIS — R07.89 OTHER CHEST PAIN: ICD-10-CM

## 2025-01-20 DIAGNOSIS — Z98.61 HISTORY OF PTCA: ICD-10-CM

## 2025-01-20 DIAGNOSIS — I20.0 CRESCENDO ANGINA (MULTI): ICD-10-CM

## 2025-01-24 PROBLEM — I25.118 ATHSCL HEART DISEASE OF NATIVE COR ART W OTH ANG PCTRS: Status: ACTIVE | Noted: 2025-01-20

## 2025-02-06 ENCOUNTER — APPOINTMENT (OUTPATIENT)
Dept: CARDIOLOGY | Facility: CLINIC | Age: 65
End: 2025-02-06
Payer: COMMERCIAL

## 2025-02-07 ENCOUNTER — HOSPITAL ENCOUNTER (OUTPATIENT)
Facility: HOSPITAL | Age: 65
Setting detail: OUTPATIENT SURGERY
Discharge: HOME | End: 2025-02-07
Attending: INTERNAL MEDICINE | Admitting: INTERNAL MEDICINE
Payer: MEDICARE

## 2025-02-07 VITALS
HEIGHT: 62 IN | TEMPERATURE: 97.9 F | DIASTOLIC BLOOD PRESSURE: 72 MMHG | HEART RATE: 72 BPM | SYSTOLIC BLOOD PRESSURE: 128 MMHG | BODY MASS INDEX: 22.63 KG/M2 | OXYGEN SATURATION: 96 % | RESPIRATION RATE: 18 BRPM | WEIGHT: 123 LBS

## 2025-02-07 DIAGNOSIS — Z95.5 S/P RIGHT CORONARY ARTERY (RCA) STENT PLACEMENT: ICD-10-CM

## 2025-02-07 DIAGNOSIS — I25.118 ATHSCL HEART DISEASE OF NATIVE COR ART W OTH ANG PCTRS: ICD-10-CM

## 2025-02-07 DIAGNOSIS — E78.2 MIXED HYPERLIPIDEMIA: ICD-10-CM

## 2025-02-07 DIAGNOSIS — Z98.61 HISTORY OF PTCA: ICD-10-CM

## 2025-02-07 DIAGNOSIS — I25.10 ATHEROSCLEROSIS OF NATIVE CORONARY ARTERY OF NATIVE HEART WITHOUT ANGINA PECTORIS: ICD-10-CM

## 2025-02-07 DIAGNOSIS — I20.9 ANGINA PECTORIS, UNSPECIFIED: ICD-10-CM

## 2025-02-07 PROCEDURE — 7100000009 HC PHASE TWO TIME - INITIAL BASE CHARGE: Performed by: INTERNAL MEDICINE

## 2025-02-07 PROCEDURE — 2720000007 HC OR 272 NO HCPCS: Performed by: INTERNAL MEDICINE

## 2025-02-07 PROCEDURE — 2500000004 HC RX 250 GENERAL PHARMACY W/ HCPCS (ALT 636 FOR OP/ED): Performed by: INTERNAL MEDICINE

## 2025-02-07 PROCEDURE — 99152 MOD SED SAME PHYS/QHP 5/>YRS: CPT | Performed by: INTERNAL MEDICINE

## 2025-02-07 PROCEDURE — C1760 CLOSURE DEV, VASC: HCPCS | Performed by: INTERNAL MEDICINE

## 2025-02-07 PROCEDURE — 2550000001 HC RX 255 CONTRASTS: Performed by: INTERNAL MEDICINE

## 2025-02-07 PROCEDURE — C1894 INTRO/SHEATH, NON-LASER: HCPCS | Performed by: INTERNAL MEDICINE

## 2025-02-07 PROCEDURE — 7100000010 HC PHASE TWO TIME - EACH INCREMENTAL 1 MINUTE: Performed by: INTERNAL MEDICINE

## 2025-02-07 PROCEDURE — 93458 L HRT ARTERY/VENTRICLE ANGIO: CPT | Performed by: INTERNAL MEDICINE

## 2025-02-07 PROCEDURE — C1887 CATHETER, GUIDING: HCPCS | Performed by: INTERNAL MEDICINE

## 2025-02-07 PROCEDURE — 96373 THER/PROPH/DIAG INJ IA: CPT | Performed by: INTERNAL MEDICINE

## 2025-02-07 RX ORDER — MIDAZOLAM HYDROCHLORIDE 1 MG/ML
1 INJECTION, SOLUTION INTRAMUSCULAR; INTRAVENOUS ONCE
Status: COMPLETED | OUTPATIENT
Start: 2025-02-07 | End: 2025-02-07

## 2025-02-07 RX ORDER — NICARDIPINE HYDROCHLORIDE 0.2 MG/ML
INJECTION INTRAVENOUS AS NEEDED
Status: DISCONTINUED | OUTPATIENT
Start: 2025-02-07 | End: 2025-02-07 | Stop reason: HOSPADM

## 2025-02-07 RX ORDER — FENTANYL CITRATE 50 UG/ML
INJECTION, SOLUTION INTRAMUSCULAR; INTRAVENOUS AS NEEDED
Status: DISCONTINUED | OUTPATIENT
Start: 2025-02-07 | End: 2025-02-07 | Stop reason: HOSPADM

## 2025-02-07 RX ORDER — NITROGLYCERIN 5 MG/ML
INJECTION, SOLUTION INTRAVENOUS AS NEEDED
Status: DISCONTINUED | OUTPATIENT
Start: 2025-02-07 | End: 2025-02-07 | Stop reason: HOSPADM

## 2025-02-07 RX ORDER — HEPARIN SODIUM 1000 [USP'U]/ML
INJECTION, SOLUTION INTRAVENOUS; SUBCUTANEOUS AS NEEDED
Status: DISCONTINUED | OUTPATIENT
Start: 2025-02-07 | End: 2025-02-07 | Stop reason: HOSPADM

## 2025-02-07 RX ORDER — MIDAZOLAM HYDROCHLORIDE 1 MG/ML
INJECTION, SOLUTION INTRAMUSCULAR; INTRAVENOUS AS NEEDED
Status: DISCONTINUED | OUTPATIENT
Start: 2025-02-07 | End: 2025-02-07 | Stop reason: HOSPADM

## 2025-02-07 RX ORDER — LIDOCAINE HYDROCHLORIDE 20 MG/ML
INJECTION, SOLUTION INFILTRATION; PERINEURAL AS NEEDED
Status: DISCONTINUED | OUTPATIENT
Start: 2025-02-07 | End: 2025-02-07 | Stop reason: HOSPADM

## 2025-02-07 RX ADMIN — MIDAZOLAM 1 MG: 1 INJECTION INTRAMUSCULAR; INTRAVENOUS at 10:37

## 2025-02-07 ASSESSMENT — PAIN SCALES - GENERAL
PAINLEVEL_OUTOF10: 0 - NO PAIN

## 2025-02-07 ASSESSMENT — COLUMBIA-SUICIDE SEVERITY RATING SCALE - C-SSRS
2. HAVE YOU ACTUALLY HAD ANY THOUGHTS OF KILLING YOURSELF?: NO
1. IN THE PAST MONTH, HAVE YOU WISHED YOU WERE DEAD OR WISHED YOU COULD GO TO SLEEP AND NOT WAKE UP?: NO

## 2025-02-07 ASSESSMENT — PAIN - FUNCTIONAL ASSESSMENT
PAIN_FUNCTIONAL_ASSESSMENT: 0-10
PAIN_FUNCTIONAL_ASSESSMENT: 0-10

## 2025-02-07 NOTE — DISCHARGE INSTRUCTIONS

## 2025-02-07 NOTE — NURSING NOTE
Out of bed independently without difficulty. Right radial procedural access site remains soft with dressing dry/intact and no bleeding or hematoma noted. Discharge instructions given and reviewed with patient. Patient's  present for discharge instructions. Discharged to private vehicle via ambulatory status with steady gait and accompanied by her , escorted to OP lobby doors by cath lab RN.

## 2025-02-28 ENCOUNTER — APPOINTMENT (OUTPATIENT)
Dept: CARDIOLOGY | Facility: CLINIC | Age: 65
End: 2025-02-28
Payer: COMMERCIAL

## 2025-02-28 VITALS
DIASTOLIC BLOOD PRESSURE: 68 MMHG | SYSTOLIC BLOOD PRESSURE: 114 MMHG | BODY MASS INDEX: 24.29 KG/M2 | HEART RATE: 76 BPM | WEIGHT: 132 LBS | HEIGHT: 62 IN

## 2025-02-28 DIAGNOSIS — F17.210 CIGARETTE SMOKER: ICD-10-CM

## 2025-02-28 DIAGNOSIS — Z79.899 MEDICATION COURSE CHANGED: ICD-10-CM

## 2025-02-28 DIAGNOSIS — E78.2 MIXED HYPERLIPIDEMIA: ICD-10-CM

## 2025-02-28 DIAGNOSIS — Z71.2 ENCOUNTER TO DISCUSS TEST RESULTS: ICD-10-CM

## 2025-02-28 DIAGNOSIS — I25.118 CORONARY ARTERY DISEASE OF NATIVE ARTERY OF NATIVE HEART WITH STABLE ANGINA PECTORIS: ICD-10-CM

## 2025-02-28 DIAGNOSIS — Z79.02 ANTIPLATELET OR ANTITHROMBOTIC LONG-TERM USE: ICD-10-CM

## 2025-02-28 DIAGNOSIS — I47.10 PAROXYSMAL SUPRAVENTRICULAR TACHYCARDIA (CMS-HCC): ICD-10-CM

## 2025-02-28 PROBLEM — I77.9 CAROTID DISEASE, BILATERAL (CMS-HCC): Status: ACTIVE | Noted: 2025-02-28

## 2025-02-28 PROCEDURE — 1159F MED LIST DOCD IN RCRD: CPT | Performed by: INTERNAL MEDICINE

## 2025-02-28 PROCEDURE — 1160F RVW MEDS BY RX/DR IN RCRD: CPT | Performed by: INTERNAL MEDICINE

## 2025-02-28 PROCEDURE — 3008F BODY MASS INDEX DOCD: CPT | Performed by: INTERNAL MEDICINE

## 2025-02-28 PROCEDURE — 3078F DIAST BP <80 MM HG: CPT | Performed by: INTERNAL MEDICINE

## 2025-02-28 PROCEDURE — 3074F SYST BP LT 130 MM HG: CPT | Performed by: INTERNAL MEDICINE

## 2025-02-28 PROCEDURE — 99214 OFFICE O/P EST MOD 30 MIN: CPT | Performed by: INTERNAL MEDICINE

## 2025-02-28 PROCEDURE — 4004F PT TOBACCO SCREEN RCVD TLK: CPT | Performed by: INTERNAL MEDICINE

## 2025-02-28 RX ORDER — RANOLAZINE 500 MG/1
500 TABLET, EXTENDED RELEASE ORAL 2 TIMES DAILY
Qty: 180 TABLET | Refills: 3 | Status: SHIPPED | OUTPATIENT
Start: 2025-02-28 | End: 2026-02-28

## 2025-02-28 RX ORDER — IBUPROFEN 200 MG
1 TABLET ORAL EVERY 24 HOURS
Qty: 45 PATCH | Refills: 0 | Status: SHIPPED | OUTPATIENT
Start: 2025-02-28 | End: 2025-04-14

## 2025-02-28 RX ORDER — NICOTINE 7MG/24HR
1 PATCH, TRANSDERMAL 24 HOURS TRANSDERMAL EVERY 24 HOURS
Qty: 30 PATCH | Refills: 0 | Status: SHIPPED | OUTPATIENT
Start: 2025-02-28 | End: 2025-04-14

## 2025-02-28 NOTE — PROGRESS NOTES
Patient with known atherosclerotic native vessel coronary artery disease, multiple cardiac risk factors, stented coronary artery, presented to me with crescendo angina pectoris, medical therapy was initiated, and patient was referred for coronary angiography, she presents for follow-up.    Subjective :     Continues to have chest heaviness if she does not take the isosorbide mononitrate.  Does get headache from the isosorbide mononitrate.  Continues to smoke, but is more motivated to make efforts to quit.  Interval review of systems is negative for chest discomfort pressure tightness heaviness palpitations lightheadedness orthopnea paroxysmal nocturnal dyspnea dependent edema or claudication TIA or CVA type symptoms or bleeding diathesis    12 point ROS is negative or non contributory except as noted.       History so Far :    1. Atherosclerotic native vessel coronary artery disease with history of coronary event in 2017 and 1 stent-this is per patient  2. No symptoms of angina or palpitations or congestive heart failure at this time  3. Current 1 pack/day smoker  4. GERD-on prophylaxis  5. Patient remains on dual antiplatelet therapy  6. History of partial hysterectomy  7. History of renal lithotripsy  8. History of paroxysmal supraventricular tachycardia  9. Hyperlipidemia  10.  Has established primary care with Dr. Hill Sutherland.  11.  Carotid ultrasound August 2021-less than 50% bilateral internal carotid artery stenosis       12 ultrasound of the abdominal aorta and iliac arteries June 2024-no evidence of abdominal aortic aneurysm.       13.  Left heart catheterization February 2025-right dominant normal left main diffusely diseased left anterior descending artery 10 to 30% mid LAD luminal irregularities of the left circumflex artery mildly diseased right coronary artery with patent previously placed stents mid RCA 40% stenosis LVEF 65% LV end-diastolic pressure 12 mmHg no wall motion  "abnormalities    Objective   Wt Readings from Last 3 Encounters:   02/28/25 59.9 kg (132 lb)   02/07/25 55.8 kg (123 lb)   01/17/25 58.1 kg (128 lb)            Vitals:    02/28/25 1053   BP: 114/68   BP Location: Left arm   Patient Position: Standing   Pulse: 76   Weight: 59.9 kg (132 lb)   Height: 1.575 m (5' 2\")                Physical Exam:    GENERAL APPEARANCE: in no acute distress.  CHEST: Symmetric and non-tender.  INTEGUMENT: Skin warm and dry  HEENT: No gross abnormalities identified.No pallor or scleral icterus.  NECK: Supple, no JVD, no bruit.   NEURO/PSHCY: Alert and oriented x3; appropriate behavior and responses and responses  LUNGS: Clear to auscultation bilaterally; normal respiratory effort.  Breath sounds are very distant  HEART: Rate and rhythm regular with no evident murmur; no gallop appreciated.   ABDOMEN: Soft, non tender.  MUSCULOSKELETAL: No gross deformities.  EXTREMITIES: Warm  There is no edema noted.    Meds:  Current Outpatient Medications   Medication Instructions    atorvastatin (LIPITOR) 40 mg, oral, Daily    clopidogrel (PLAVIX) 75 mg, oral, Daily    esomeprazole (NexIUM) 40 mg DR capsule 1 capsule, Every 24 hours    isosorbide mononitrate ER (IMDUR) 30 mg, oral, Daily, Do not crush or chew.    metoprolol succinate XL (TOPROL-XL) 50 mg, oral, Daily    nicotine (Nicoderm CQ) 14 mg/24 hr patch 1 patch, transdermal, Every 24 hours    nicotine (Nicoderm CQ) 7 mg/24 hr patch 1 patch, transdermal, Every 24 hours    nitroglycerin (NITROSTAT) 0.4 mg, sublingual, Every 5 min PRN, May repeat dose every 5 minutes for up to 3 doses total.    potassium chloride CR 20 mEq ER tablet 20 mEq, oral, Daily    ranolazine (RANEXA) 500 mg, oral, 2 times daily, Do not crush, chew, or split.    tiotropium (Spiriva Respimat) 2.5 mcg/actuation inhaler Every 24 hours          No Known Allergies          LABS:    Lipids:  Lab Results   Component Value Date    CHOL 101 (L) 07/29/2021    CHOL 111 (L) 07/20/2020 " "   CHOL 107 (L) 04/10/2018     Lab Results   Component Value Date    HDL 32 (L) 07/29/2021    HDL 45 (L) 07/20/2020    HDL 47 (L) 04/10/2018     Lab Results   Component Value Date    LDLCALC 52 (L) 07/29/2021    LDLCALC 54 (L) 07/20/2020    LDLCALC 50 (L) 04/10/2018     Lab Results   Component Value Date    TRIG 87 07/29/2021    TRIG 58 07/20/2020    TRIG 52 04/10/2018     No components found for: \"CHOLHDL\"  A1C  Lab Results   Component Value Date    HGBA1C 5.4 05/12/2022     CBC        GFR greater than 60 sodium 140 potassium 4.1 liver enzymes normal hemoglobin 15.9 hematocrit 46.4 platelets 2 50,000          Patient Active Problem List    Diagnosis Date Noted    Athscl heart disease of native cor art w oth ang pctrs 01/20/2025    Encounter to discuss test results 02/28/2025    Antiplatelet or antithrombotic long-term use 02/28/2025    Carotid disease, bilateral (CMS-HCC) 02/28/2025    Medication course changed 01/17/2025    Body mass index (BMI) of 24.0 to 24.9 in adult 04/29/2024    Paroxysmal supraventricular tachycardia (CMS-HCC) 04/29/2024    Atherosclerosis of native coronary artery of native heart without angina pectoris 01/18/2024    History of PTCA 01/18/2024    Abnormal PET scan of head 12/12/2023    Coronary artery disease of native artery of native heart with stable angina pectoris 12/12/2023    Gastroesophageal reflux disease 12/12/2023    Sialolithiasis 12/12/2023    Essential hypertension 12/12/2023    Hoarseness 12/12/2023    Kidney stone 12/12/2023    S/P right coronary artery (RCA) stent placement 12/12/2023    Cigarette smoker 12/12/2023    Vocal cord polyps 12/12/2023    Hyperlipidemia 12/12/2023                 Assessment:    1. Coronary artery disease of native artery of native heart with stable angina pectoris  Follow Up In Cardiology    nicotine (Nicoderm CQ) 14 mg/24 hr patch    nicotine (Nicoderm CQ) 7 mg/24 hr patch    ranolazine (Ranexa) 500 mg 12 hr tablet    Follow Up In Cardiology    " Alanine Aminotransferase    Aspartate Aminotransferase    Lipid Panel    Alanine Aminotransferase    Aspartate Aminotransferase    Lipid Panel      2. Encounter to discuss test results        3. Antiplatelet or antithrombotic long-term use        4. Cigarette smoker  nicotine (Nicoderm CQ) 14 mg/24 hr patch    nicotine (Nicoderm CQ) 7 mg/24 hr patch      5. Body mass index (BMI) of 24.0 to 24.9 in adult        6. Medication course changed        7. Mixed hyperlipidemia        8. Paroxysmal supraventricular tachycardia (CMS-HCC)        Clinical decision making:  Patient continues to have exertional angina pectoris.  She clearly has endothelial dysfunction risk factor modification again talked about.  Her blood pressure is at target her lipid profile is at target.  She has not started the Ranexa, will start Ranexa 500 mg p.o. twice daily  Talked about nicotine cessation, she is open to an attempt, will start NicoDerm patch 14 mg/day for 6 weeks and then 7 mg a day for 6 weeks.    Follow up : 6 months  Fasting liver and lipid profile prior to next visit      Provider Attestation - Jazzmine WASHINGTON LPN Scribe documentation    All medical record entries made by the Scribe were at my direction and personally dictated by me. I have reviewed the chart and agree that the record accurately reflects my personal performance of the history, physical exam, discussion and plan.

## 2025-02-28 NOTE — LETTER
February 28, 2025     Nae Naqvi MD  [address] [facility] [id number]    Patient: Lamar Neal   YOB: 1960   Date of Visit: 2/28/2025       Dear Dr. Nae Naqvi MD:    Thank you for referring Lamar Neal to me for evaluation. Below are my notes for this consultation.  If you have questions, please do not hesitate to call me. I look forward to following your patient along with you.       Sincerely,     Dedra Saenz MD      CC: No Recipients  ______________________________________________________________________________________    Patient with known atherosclerotic native vessel coronary artery disease, multiple cardiac risk factors, stented coronary artery, presented to me with crescendo angina pectoris, medical therapy was initiated, and patient was referred for coronary angiography, she presents for follow-up.    Subjective :     Continues to have chest heaviness if she does not take the isosorbide mononitrate.  Does get headache from the isosorbide mononitrate.  Continues to smoke, but is more motivated to make efforts to quit.  Interval review of systems is negative for chest discomfort pressure tightness heaviness palpitations lightheadedness orthopnea paroxysmal nocturnal dyspnea dependent edema or claudication TIA or CVA type symptoms or bleeding diathesis    12 point ROS is negative or non contributory except as noted.       History so Far :    1. Atherosclerotic native vessel coronary artery disease with history of coronary event in 2017 and 1 stent-this is per patient  2. No symptoms of angina or palpitations or congestive heart failure at this time  3. Current 1 pack/day smoker  4. GERD-on prophylaxis  5. Patient remains on dual antiplatelet therapy  6. History of partial hysterectomy  7. History of renal lithotripsy  8. History of paroxysmal supraventricular tachycardia  9. Hyperlipidemia  10.  Has established primary care with Dr. Hill Sutherland.  11.  Carotid ultrasound  "August 2021-less than 50% bilateral internal carotid artery stenosis       12 ultrasound of the abdominal aorta and iliac arteries June 2024-no evidence of abdominal aortic aneurysm.       13.  Left heart catheterization February 2025-right dominant normal left main diffusely diseased left anterior descending artery 10 to 30% mid LAD luminal irregularities of the left circumflex artery mildly diseased right coronary artery with patent previously placed stents mid RCA 40% stenosis LVEF 65% LV end-diastolic pressure 12 mmHg no wall motion abnormalities    Objective   Wt Readings from Last 3 Encounters:   02/28/25 59.9 kg (132 lb)   02/07/25 55.8 kg (123 lb)   01/17/25 58.1 kg (128 lb)            Vitals:    02/28/25 1053   BP: 114/68   BP Location: Left arm   Patient Position: Standing   Pulse: 76   Weight: 59.9 kg (132 lb)   Height: 1.575 m (5' 2\")                Physical Exam:    GENERAL APPEARANCE: in no acute distress.  CHEST: Symmetric and non-tender.  INTEGUMENT: Skin warm and dry  HEENT: No gross abnormalities identified.No pallor or scleral icterus.  NECK: Supple, no JVD, no bruit.   NEURO/PSHCY: Alert and oriented x3; appropriate behavior and responses and responses  LUNGS: Clear to auscultation bilaterally; normal respiratory effort.  Breath sounds are very distant  HEART: Rate and rhythm regular with no evident murmur; no gallop appreciated.   ABDOMEN: Soft, non tender.  MUSCULOSKELETAL: No gross deformities.  EXTREMITIES: Warm  There is no edema noted.    Meds:  Current Outpatient Medications   Medication Instructions   • atorvastatin (LIPITOR) 40 mg, oral, Daily   • clopidogrel (PLAVIX) 75 mg, oral, Daily   • esomeprazole (NexIUM) 40 mg DR capsule 1 capsule, Every 24 hours   • isosorbide mononitrate ER (IMDUR) 30 mg, oral, Daily, Do not crush or chew.   • metoprolol succinate XL (TOPROL-XL) 50 mg, oral, Daily   • nicotine (Nicoderm CQ) 14 mg/24 hr patch 1 patch, transdermal, Every 24 hours   • nicotine " "(Nicoderm CQ) 7 mg/24 hr patch 1 patch, transdermal, Every 24 hours   • nitroglycerin (NITROSTAT) 0.4 mg, sublingual, Every 5 min PRN, May repeat dose every 5 minutes for up to 3 doses total.   • potassium chloride CR 20 mEq ER tablet 20 mEq, oral, Daily   • ranolazine (RANEXA) 500 mg, oral, 2 times daily, Do not crush, chew, or split.   • tiotropium (Spiriva Respimat) 2.5 mcg/actuation inhaler Every 24 hours          No Known Allergies          LABS:    Lipids:  Lab Results   Component Value Date    CHOL 101 (L) 07/29/2021    CHOL 111 (L) 07/20/2020    CHOL 107 (L) 04/10/2018     Lab Results   Component Value Date    HDL 32 (L) 07/29/2021    HDL 45 (L) 07/20/2020    HDL 47 (L) 04/10/2018     Lab Results   Component Value Date    LDLCALC 52 (L) 07/29/2021    LDLCALC 54 (L) 07/20/2020    LDLCALC 50 (L) 04/10/2018     Lab Results   Component Value Date    TRIG 87 07/29/2021    TRIG 58 07/20/2020    TRIG 52 04/10/2018     No components found for: \"CHOLHDL\"  A1C  Lab Results   Component Value Date    HGBA1C 5.4 05/12/2022     CBC        GFR greater than 60 sodium 140 potassium 4.1 liver enzymes normal hemoglobin 15.9 hematocrit 46.4 platelets 2 50,000          Patient Active Problem List    Diagnosis Date Noted   • Athscl heart disease of native cor art w ot ang pctrs 01/20/2025   • Encounter to discuss test results 02/28/2025   • Antiplatelet or antithrombotic long-term use 02/28/2025   • Carotid disease, bilateral (CMS-HCC) 02/28/2025   • Medication course changed 01/17/2025   • Body mass index (BMI) of 24.0 to 24.9 in adult 04/29/2024   • Paroxysmal supraventricular tachycardia (CMS-HCC) 04/29/2024   • Atherosclerosis of native coronary artery of native heart without angina pectoris 01/18/2024   • History of PTCA 01/18/2024   • Abnormal PET scan of head 12/12/2023   • Coronary artery disease of native artery of native heart with stable angina pectoris 12/12/2023   • Gastroesophageal reflux disease 12/12/2023   • " Sialolithiasis 12/12/2023   • Essential hypertension 12/12/2023   • Hoarseness 12/12/2023   • Kidney stone 12/12/2023   • S/P right coronary artery (RCA) stent placement 12/12/2023   • Cigarette smoker 12/12/2023   • Vocal cord polyps 12/12/2023   • Hyperlipidemia 12/12/2023                 Assessment:    1. Coronary artery disease of native artery of native heart with stable angina pectoris  Follow Up In Cardiology    nicotine (Nicoderm CQ) 14 mg/24 hr patch    nicotine (Nicoderm CQ) 7 mg/24 hr patch    ranolazine (Ranexa) 500 mg 12 hr tablet    Follow Up In Cardiology    Alanine Aminotransferase    Aspartate Aminotransferase    Lipid Panel    Alanine Aminotransferase    Aspartate Aminotransferase    Lipid Panel      2. Encounter to discuss test results        3. Antiplatelet or antithrombotic long-term use        4. Cigarette smoker  nicotine (Nicoderm CQ) 14 mg/24 hr patch    nicotine (Nicoderm CQ) 7 mg/24 hr patch      5. Body mass index (BMI) of 24.0 to 24.9 in adult        6. Medication course changed        7. Mixed hyperlipidemia        8. Paroxysmal supraventricular tachycardia (CMS-HCC)        Clinical decision making:  Patient continues to have exertional angina pectoris.  She clearly has endothelial dysfunction risk factor modification again talked about.  Her blood pressure is at target her lipid profile is at target.  She has not started the Ranexa, will start Ranexa 500 mg p.o. twice daily  Talked about nicotine cessation, she is open to an attempt, will start NicoDerm patch 14 mg/day for 6 weeks and then 7 mg a day for 6 weeks.    Follow up : 6 months  Fasting liver and lipid profile prior to next visit      Provider Attestation - Jazzmine WASHINGTON LPN Scribe documentation    All medical record entries made by the Scribe were at my direction and personally dictated by me. I have reviewed the chart and agree that the record accurately reflects my personal performance of the history, physical exam, discussion  and plan.

## 2025-03-12 DIAGNOSIS — E78.5 HYPERLIPIDEMIA, UNSPECIFIED HYPERLIPIDEMIA TYPE: ICD-10-CM

## 2025-03-12 DIAGNOSIS — I25.118 CORONARY ARTERY DISEASE OF NATIVE ARTERY OF NATIVE HEART WITH STABLE ANGINA PECTORIS: ICD-10-CM

## 2025-03-12 RX ORDER — ATORVASTATIN CALCIUM 40 MG/1
40 TABLET, FILM COATED ORAL DAILY
Qty: 90 TABLET | Refills: 1 | Status: SHIPPED | OUTPATIENT
Start: 2025-03-12 | End: 2026-03-12

## 2025-04-16 ENCOUNTER — TELEPHONE (OUTPATIENT)
Dept: CARDIOLOGY | Facility: CLINIC | Age: 65
End: 2025-04-16
Payer: COMMERCIAL

## 2025-04-16 DIAGNOSIS — I10 ESSENTIAL HYPERTENSION: ICD-10-CM

## 2025-04-16 RX ORDER — METOPROLOL SUCCINATE 100 MG/1
50 TABLET, EXTENDED RELEASE ORAL DAILY
Qty: 45 TABLET | Refills: 0 | Status: SHIPPED | OUTPATIENT
Start: 2025-04-16 | End: 2026-04-16

## 2025-04-16 NOTE — TELEPHONE ENCOUNTER
Patient request refill on Toprol xl 100mg 1/2 tablet daily.   Aug visit pending. Would prefer a 50 mg tablet the next file. To Dr. Mandeep David MD to approve.

## 2025-04-28 ENCOUNTER — APPOINTMENT (OUTPATIENT)
Dept: CARDIOLOGY | Facility: CLINIC | Age: 65
End: 2025-04-28
Payer: COMMERCIAL

## 2025-05-06 DIAGNOSIS — I25.10 ATHEROSCLEROSIS OF NATIVE CORONARY ARTERY OF NATIVE HEART WITHOUT ANGINA PECTORIS: ICD-10-CM

## 2025-05-06 RX ORDER — CLOPIDOGREL BISULFATE 75 MG/1
75 TABLET ORAL DAILY
Qty: 90 TABLET | Refills: 3 | Status: SHIPPED | OUTPATIENT
Start: 2025-05-06 | End: 2026-05-06

## 2025-08-04 DIAGNOSIS — I10 ESSENTIAL HYPERTENSION: ICD-10-CM

## 2025-08-04 DIAGNOSIS — R07.89 OTHER CHEST PAIN: ICD-10-CM

## 2025-08-04 RX ORDER — ISOSORBIDE MONONITRATE 30 MG/1
30 TABLET, EXTENDED RELEASE ORAL DAILY
Qty: 90 TABLET | Refills: 0 | Status: SHIPPED | OUTPATIENT
Start: 2025-08-04 | End: 2026-08-04

## 2025-08-04 RX ORDER — METOPROLOL SUCCINATE 100 MG/1
50 TABLET, EXTENDED RELEASE ORAL DAILY
Qty: 45 TABLET | Refills: 0 | Status: SHIPPED | OUTPATIENT
Start: 2025-08-04 | End: 2026-08-04

## 2025-08-04 NOTE — TELEPHONE ENCOUNTER
Patient called for refills. She is out of Imdur. Patient is scheduled 8/29. Will sent 90 days with rf x o. To Dr. Dedra Saenz MD

## 2025-08-29 ENCOUNTER — APPOINTMENT (OUTPATIENT)
Dept: CARDIOLOGY | Facility: CLINIC | Age: 65
End: 2025-08-29
Payer: COMMERCIAL

## 2025-08-29 VITALS
SYSTOLIC BLOOD PRESSURE: 122 MMHG | DIASTOLIC BLOOD PRESSURE: 70 MMHG | BODY MASS INDEX: 23.85 KG/M2 | WEIGHT: 129.6 LBS | HEART RATE: 86 BPM | HEIGHT: 62 IN

## 2025-08-29 DIAGNOSIS — E78.2 MIXED HYPERLIPIDEMIA: ICD-10-CM

## 2025-08-29 DIAGNOSIS — R07.89 OTHER CHEST PAIN: ICD-10-CM

## 2025-08-29 DIAGNOSIS — Z79.899 MEDICATION COURSE CHANGED: ICD-10-CM

## 2025-08-29 DIAGNOSIS — F17.210 CIGARETTE SMOKER: ICD-10-CM

## 2025-08-29 DIAGNOSIS — I25.118 CORONARY ARTERY DISEASE OF NATIVE ARTERY OF NATIVE HEART WITH STABLE ANGINA PECTORIS: ICD-10-CM

## 2025-08-29 DIAGNOSIS — I10 ESSENTIAL HYPERTENSION: ICD-10-CM

## 2025-08-29 PROBLEM — I20.0 UNSTABLE ANGINA (MULTI): Status: ACTIVE | Noted: 2025-08-29

## 2025-08-29 PROBLEM — I20.0 UNSTABLE ANGINA (MULTI): Status: RESOLVED | Noted: 2025-08-29 | Resolved: 2025-08-29

## 2025-08-29 PROCEDURE — 1160F RVW MEDS BY RX/DR IN RCRD: CPT | Performed by: INTERNAL MEDICINE

## 2025-08-29 PROCEDURE — 99214 OFFICE O/P EST MOD 30 MIN: CPT | Performed by: INTERNAL MEDICINE

## 2025-08-29 PROCEDURE — 3008F BODY MASS INDEX DOCD: CPT | Performed by: INTERNAL MEDICINE

## 2025-08-29 PROCEDURE — 1159F MED LIST DOCD IN RCRD: CPT | Performed by: INTERNAL MEDICINE

## 2025-08-29 PROCEDURE — 3074F SYST BP LT 130 MM HG: CPT | Performed by: INTERNAL MEDICINE

## 2025-08-29 PROCEDURE — 4004F PT TOBACCO SCREEN RCVD TLK: CPT | Performed by: INTERNAL MEDICINE

## 2025-08-29 PROCEDURE — 3078F DIAST BP <80 MM HG: CPT | Performed by: INTERNAL MEDICINE

## 2025-08-29 RX ORDER — RANOLAZINE 1000 MG/1
1000 TABLET, EXTENDED RELEASE ORAL 2 TIMES DAILY
Qty: 180 TABLET | Refills: 3 | Status: SHIPPED | OUTPATIENT
Start: 2025-08-29 | End: 2026-08-29

## 2025-08-29 RX ORDER — ISOSORBIDE MONONITRATE 60 MG/1
60 TABLET, EXTENDED RELEASE ORAL DAILY
Qty: 90 TABLET | Refills: 3 | Status: SHIPPED | OUTPATIENT
Start: 2025-08-29 | End: 2026-08-29

## 2025-11-17 ENCOUNTER — APPOINTMENT (OUTPATIENT)
Dept: CARDIOLOGY | Facility: CLINIC | Age: 65
End: 2025-11-17

## (undated) DEVICE — MANIFOLD KIT, CUSTOM (SJM)

## (undated) DEVICE — MBRACE, WRIST SUPPORT WITH HOOK

## (undated) DEVICE — CATHETER, INFINITI DIAGNOSTIC, 5 FR 100CM 3DRC, WILLIAMS RIGHT OR NO TORQUE

## (undated) DEVICE — CUP, MEDICINE, CLEAR, 2 OZ, STERILE

## (undated) DEVICE — CATHETER, OPTITORQUE, 5FR, JACKY, 3.5/ 2H/110CM, CURVED

## (undated) DEVICE — CATHETER, DIAGNOSTIC, 5FR,  PIG-145, 110CM, 6SH ANGLED

## (undated) DEVICE — TR BAND, RADIAL COMPRESSION, STANDARD, 24CM

## (undated) DEVICE — SHEATH, GLIDESHEATH, SLENDER, 6FR 10CM

## (undated) DEVICE — Device